# Patient Record
Sex: MALE | Race: WHITE | HISPANIC OR LATINO | URBAN - METROPOLITAN AREA
[De-identification: names, ages, dates, MRNs, and addresses within clinical notes are randomized per-mention and may not be internally consistent; named-entity substitution may affect disease eponyms.]

---

## 2019-03-07 PROBLEM — Z00.00 ENCOUNTER FOR PREVENTIVE HEALTH EXAMINATION: Status: ACTIVE | Noted: 2019-03-07

## 2019-03-19 ENCOUNTER — OUTPATIENT (OUTPATIENT)
Dept: OUTPATIENT SERVICES | Facility: HOSPITAL | Age: 49
LOS: 1 days | End: 2019-03-19
Payer: MEDICAID

## 2019-03-19 DIAGNOSIS — R06.09 OTHER FORMS OF DYSPNEA: ICD-10-CM

## 2019-03-19 DIAGNOSIS — Z01.810 ENCOUNTER FOR PREPROCEDURAL CARDIOVASCULAR EXAMINATION: ICD-10-CM

## 2019-03-19 PROCEDURE — 78452 HT MUSCLE IMAGE SPECT MULT: CPT | Mod: 26

## 2019-03-19 PROCEDURE — 93018 CV STRESS TEST I&R ONLY: CPT

## 2019-03-19 PROCEDURE — A9500: CPT

## 2019-03-19 PROCEDURE — 93016 CV STRESS TEST SUPVJ ONLY: CPT

## 2019-03-19 PROCEDURE — 78452 HT MUSCLE IMAGE SPECT MULT: CPT

## 2019-03-19 PROCEDURE — 93017 CV STRESS TEST TRACING ONLY: CPT

## 2019-03-25 ENCOUNTER — APPOINTMENT (OUTPATIENT)
Dept: PULMONOLOGY | Facility: CLINIC | Age: 49
End: 2019-03-25
Payer: MEDICAID

## 2019-03-25 ENCOUNTER — NON-APPOINTMENT (OUTPATIENT)
Age: 49
End: 2019-03-25

## 2019-03-25 VITALS
SYSTOLIC BLOOD PRESSURE: 128 MMHG | BODY MASS INDEX: 44.36 KG/M2 | OXYGEN SATURATION: 96 % | TEMPERATURE: 97.9 F | HEART RATE: 84 BPM | HEIGHT: 66 IN | WEIGHT: 276 LBS | DIASTOLIC BLOOD PRESSURE: 78 MMHG

## 2019-03-25 DIAGNOSIS — Z01.818 ENCOUNTER FOR OTHER PREPROCEDURAL EXAMINATION: ICD-10-CM

## 2019-03-25 DIAGNOSIS — E66.01 MORBID (SEVERE) OBESITY DUE TO EXCESS CALORIES: ICD-10-CM

## 2019-03-25 PROCEDURE — 94010 BREATHING CAPACITY TEST: CPT

## 2019-03-25 PROCEDURE — 99204 OFFICE O/P NEW MOD 45 MIN: CPT | Mod: 25

## 2019-03-25 NOTE — HISTORY OF PRESENT ILLNESS
[FreeTextEntry1] : The patient is a pleasant 48-year-old gentleman referred by Dr. Elizondo for evaluation prior to planned bariatric surgery by Dr. Mc  . A gastric sleeve is planned\par \par The patient is a nonsmoker he has no history of pulmonary disease no history of asthma pneumonia bronchitis COPD\par The patient does not regularly doing aerobic exercise\par \par The patient works full time at a desk job\par He is the father of one he has 3 stepchildren and he has 4 foster children. He and his wife are having bariatric surgery\par \par The patient does note an [place] sleep scale of 20. He has a history of snoring. He has daytime somnolence as confirmed by [place] scale. A sleep study was reportedly done this past weekend at home. He does not have results as yet

## 2019-03-25 NOTE — PROCEDURE
[FreeTextEntry1] : Spirometry was reviewed there is no evidence of restriction or obstructive pulmonary disease

## 2019-03-25 NOTE — ASSESSMENT
[FreeTextEntry1] : The patient is a 48-year-old gentleman with underlying diabetes high cholesterol and morbid obesity\par He is having a gastric sleeve planned\par \par Based on physical examination, history, and spirometry there is no evidence of underlying pulmonary disease\par The patient had a cardiac evaluation, final results are unavailable at this time.\par \par Based on an elevated sleep scale, I cannot rule out clinically significant obstructive sleep apnea\par The patient had a sleep study this weekend the results of which are unavailable.\par \par The patient does not appear to have any significant pulmonary risks prior to planned surgery and is cleared from a pulmonary standpoint\par \par Securing his airway may be difficult, however in view of his large neck and inability to visualize posterior pharynx\par \par Related to this, the patient may have clinically significant obstructive sleep apnea. Prior to surgery, his sleep study must be evaluated and he may need intervention prior to surgery and in the perioperative period.

## 2019-03-25 NOTE — CONSULT LETTER
[Dear  ___] : Dear  [unfilled], [FreeTextEntry1] : I had the pleasure of evaluating your patient, LIZA MORROW , in the office today.  Please review my consultation and evaluation report that follows below.  Please do not hesitate to call me if further information is necessary or if you wish to discuss ongoing care or diagnostic work-up.   \par I very much appreciate your referral and it is a privilege to be able to provide care for your patient.\par \par Sincerely,\par  \par Abel Johnson MD, MHCM, FACP\par Pulmonary Medicine\par  of Medicine\par [facility] [employer] [facility] at [facility]\par \par [email or web address]\par [facility] [place]\par \par  [DrJanessa  ___] : Dr. SIMS

## 2019-03-25 NOTE — PHYSICAL EXAM
[General Appearance - Well Developed] : well developed [Normal Appearance] : normal appearance [Well Groomed] : well groomed [General Appearance - Well Nourished] : well nourished [No Deformities] : no deformities [General Appearance - In No Acute Distress] : no acute distress [Normal Conjunctiva] : the conjunctiva exhibited no abnormalities [Eyelids - No Xanthelasma] : the eyelids demonstrated no xanthelasmas [Normal Oropharynx] : normal oropharynx [FreeTextEntry1] : The patient's neck is thick

## 2019-04-27 ENCOUNTER — OUTPATIENT (OUTPATIENT)
Dept: OUTPATIENT SERVICES | Facility: HOSPITAL | Age: 49
LOS: 1 days | End: 2019-04-27
Payer: MEDICAID

## 2019-04-27 DIAGNOSIS — G47.33 OBSTRUCTIVE SLEEP APNEA (ADULT) (PEDIATRIC): ICD-10-CM

## 2019-04-27 PROCEDURE — 95806 SLEEP STUDY UNATT&RESP EFFT: CPT | Mod: 26

## 2019-04-27 PROCEDURE — G0399: CPT

## 2019-04-27 PROCEDURE — 95806 SLEEP STUDY UNATT&RESP EFFT: CPT

## 2019-05-10 ENCOUNTER — OUTPATIENT (OUTPATIENT)
Dept: OUTPATIENT SERVICES | Facility: HOSPITAL | Age: 49
LOS: 1 days | End: 2019-05-10

## 2019-05-21 ENCOUNTER — INPATIENT (INPATIENT)
Facility: HOSPITAL | Age: 49
LOS: 1 days | Discharge: HOME | End: 2019-05-23
Payer: MEDICAID

## 2019-05-22 PROCEDURE — 74220 X-RAY XM ESOPHAGUS 1CNTRST: CPT | Mod: 26
